# Patient Record
(demographics unavailable — no encounter records)

---

## 2025-03-18 NOTE — ASSESSMENT
[FreeTextEntry1] :  Ms. Salcedo presents for follow up  h/o 1 cm R UPJ stone, 1 cm R intrarenal stone s/p uretral stent (3/7) Ucx: Citrobacter on Ceftriaxone, now cipro  Readmitted to [facility] for stent colic Poor pain control on tamsulosin, toradol, oxybutynin  Reviewed stent colic and expectations for UTI treatment before stone removal   Recommendations -complete course of cipro -re-start tamsulosin -toradol and acetaminophen for PRN pain control -will refer to Dr. Honeycutt for definitive stone management.    I have spent 30 minutes of time on the encounter which excludes teaching and separately reported services

## 2025-03-18 NOTE — PHYSICAL EXAM
[General Appearance - Well Developed] : well developed [General Appearance - Well Nourished] : well nourished [de-identified] : mild L CVAT

## 2025-03-18 NOTE — HISTORY OF PRESENT ILLNESS
[FreeTextEntry1] : 70F presents for follow up  See in  ED on 3/7 for flank pain Found to have 1 cm proximal R ureteral stone, additional 1 cm intra-renal stone s/p stent   Readmitted for stent colic  CT (3/11): Interval placement of right double pigtail ureteral stent. Previously seen UPJ calculus is now in the renal pelvis adjacent to the stent. No right hydronephrosis. Right renal pelvic and ureteral urothelial enhancement either related to recent manipulation or infection.  Ucx & kidney cx: citrobacter -> ceftriaxone  Discharged on tamsulosin, oxybutynin, toradol , and cipro   3/18/25 Continues to struggle with stent colic States none of the medications are helping Denies fever, chills, N/V Not taking tamsulosin as instructed

## 2025-03-20 NOTE — HISTORY OF PRESENT ILLNESS
[FreeTextEntry1] : 70F presents for follow up regarding her right ureteral and intrarenal stones.  She was found to have a stone which I measured approximately 1.2 cm in her right ureter as well as a branching 1.1 cm right lower pole stone.  She underwent right ureteral stent insertion.  She has been having severe stent symptoms.  She is currently taking tamsulosin and oxybutynin and Toradol.  States making very difficult to sleep.  We discussed definitive stone management. She has been having severe pelvic pain with dysuria.

## 2025-03-20 NOTE — ASSESSMENT
[FreeTextEntry1] : 70-year-old female with large right-sided stone burden including a 1.2 cm proximal ureteral stone and a 1.1 cm right lower pole stone with branching.  She underwent ureteral stent insertion but has been having severe stent symptoms.  Will send oxycodone for severe pain.  Given the complexity of her stones I discussed definitive stone management with options including ureteroscopy versus PCNL.  She preferred undergoing PCNL in order to have a higher stone free rate and the possibility of removing the stent on postop day 1.  The patient will  undergo right PCNL for treatment of her stone. I discussed with the patients risks and benefits and alternatives to percutaneous nephrolithotomy.  The risks include bleeding, risk of transfusion, risk of delayed bleeding requiring angio-embolization, risk of infection and sepsis, risk of injury to the urethra, bladder, ureter or kidney, risk of loss of kidney, risk of injury to adjacent organs including colon, lung, liver, risk of a staged procedure, risk of stricture, risk of residual fragments, risks of anesthesia including DVT, PE, MI and death.  The patient understands these risks and wishes to proceed with percutaneous nephrolithotomy.  Plan Reviewed CT imaging and measured the stones as 1.2 cm in the right ureter and branching 1.1 cm right lower pole stone Urinalysis Urine culture Tamsulosin for stent symptoms: Prescription sent Oxycodone 5 mg 3 times daily as needed: Prescription sent Oxycodone 5 mg every 8 hours for severe pain: Prescription sent Schedule for right PCNL.  Patient will need medical clearance   I have spent 30 minutes of time on the encounter which excludes teaching and separately reported services

## 2025-03-20 NOTE — PHYSICAL EXAM
[General Appearance - Well Developed] : well developed [General Appearance - Well Nourished] : well nourished [de-identified] : mild L CVAT

## 2025-04-07 NOTE — PHYSICAL EXAM
[General Appearance - Well Developed] : well developed [General Appearance - Well Nourished] : well nourished [de-identified] : mild L CVAT

## 2025-04-07 NOTE — ASSESSMENT
[FreeTextEntry1] : 71-year-old female who underwent right PCNL for a 1.2 cm ureteral stone and 1.1 cm intrarenal right-sided stone.  She had her stent removed postop day 1 and has continued to have some right flank and back pain.  Her renal ultrasound today did not demonstrate any hydronephrosis but showed a stable right subcapsular hematoma.  She was encouraged to continue with pain control and Toradol and oxybutynin were sent to be taken as needed.  Discussed importance of increasing fluid intake and if her pain does not begin to resolve in the next 1 to 2 weeks will have her return and undergo cross-sectional imaging  Plan Urinalysis Urine culture  I have reviewed images of patient's renal us and discussed results with patient demonstrating stable subcapsular hematoma without hydro ketorolac 10 mg 1-2 times daily prn - prescription sent oxycodone 5 mg q6 hrs prn - prescription sent FU in 3 months for renal us or sooner if pain does not continue to improve over next 1-2 weeks   Patient is being seen today for evaluation and management of a chronic and longitudinal ongoing condition and I am of the primary treating physician.

## 2025-04-07 NOTE — HISTORY OF PRESENT ILLNESS
[FreeTextEntry1] :  #[id number]  71 F with 2 large right sided stones including a 1.2 cm obstructing ureteral stone underwent ureteral stent insertion.  She recently underwent PCNL and had her stones completely removed.  She had her stent removed while inpatient.  She has been having right back and flank pain but denies dysuria gross hematuria.  Renal ultrasound today did not demonstrate any hydronephrosis but showed an unchanged subcapsular hematoma which may be causing her pain.

## 2025-04-25 NOTE — HISTORY OF PRESENT ILLNESS
[FreeTextEntry1] :  #[id number]  71 F with 2 large right sided stones including a 1.2 cm obstructing ureteral stone underwent ureteral stent insertion.  She recently underwent PCNL and had her stones completely removed.  She had her stent removed while inpatient. She developed a martha-nephric hematoma causing post operative pain. 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   She is continuing to have pain but only with coughing or sneezing and when she lays in certain positions.

## 2025-04-25 NOTE — PHYSICAL EXAM
[General Appearance - Well Developed] : well developed [General Appearance - Well Nourished] : well nourished [de-identified] : mild L CVAT

## 2025-04-25 NOTE — ASSESSMENT
[FreeTextEntry1] : 71-year-old female who underwent right PCNL for a 1.2 cm ureteral stone and 1.1 cm intrarenal right-sided stone. She developed AVM and underwent embolization. She continues to have flank pain. Will send toradol for pain control and will fu on recent labs sent by PCP. If pain does not continue to improve would recommend CT Urogram   Patient is being seen today for evaluation and management of a chronic and longitudinal ongoing condition and I am of the primary treating physician.

## 2025-05-08 NOTE — HISTORY OF PRESENT ILLNESS
[FreeTextEntry1] :  #[id number]  71 F with 2 large right sided stones including a 1.2 cm obstructing ureteral stone underwent ureteral stent insertion.  She recently underwent PCNL and had her stones completely removed.  She had her stent removed while inpatient. She developed a martha-nephric hematoma causing post operative pain. 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   She is continuing to have pain but only with coughing or sneezing and when she lays in certain positions.

## 2025-07-07 NOTE — HISTORY OF PRESENT ILLNESS
[FreeTextEntry1] : 71 F with 2 large right sided stones including a 1.2 cm obstructing ureteral stone underwent ureteral stent insertion.  She recently underwent PCNL and had her stones completely removed.  She had her stent removed while inpatient. She developed a martha-nephric hematoma causing post operative pain. 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   Renal us done today showing resolution of perinephric hematoma and no stones.   Stone path - 60% CaPhos, 40% CaOx  Reviewed dietary modifications for stone prevention.   States she has been having intermittent pelvic pain with urination currently without dysuria frequency or urgency.

## 2025-07-07 NOTE — ASSESSMENT
[FreeTextEntry1] : 71-year-old female who underwent right PCNL for a 1.2 cm ureteral stone and 1.1 cm intrarenal right-sided stone. She developed AVM and underwent embolization. Pain has improved. She had renal us done today which was negative for stones or perinephric hematoma. States she has intermittent pelvic pain with pain during urination.   Plan  I have reviewed images of patient's renal us and discussed results with patient demonstrating no hydro stones and resolution of perinephric hematoma Urinalysis Urine culture Reviewed dietary modifications for stone prevention Reviewed stone analysis: 60% calcium phosphate, 40% calcium oxalate Patient with CT imaging from her physician.  Will reach out to physician office to obtain these results for review Will call with urine results Follow-up annually for renal ultrasound   Patient is being seen today for evaluation and management of a chronic and longitudinal ongoing condition and I am of the primary treating physician.

## 2025-07-07 NOTE — PHYSICAL EXAM
[General Appearance - Well Developed] : well developed [General Appearance - Well Nourished] : well nourished [de-identified] : mild L CVAT